# Patient Record
(demographics unavailable — no encounter records)

---

## 2024-11-19 NOTE — PHYSICAL EXAM
[Alert] : alert [Oriented x 3] : ~L oriented x 3 [Well Nourished] : well nourished [Conjunctiva Non-injected] : conjunctiva non-injected [No Visual Lymphadenopathy] : no visual  lymphadenopathy [No Clubbing] : no clubbing [No Edema] : no edema [No Bromhidrosis] : no bromhidrosis [No Chromhidrosis] : no chromhidrosis [FreeTextEntry3] : R lateral upper forehead with 0.5 x 0.4cm well healed biopsy site

## 2024-11-19 NOTE — HISTORY OF PRESENT ILLNESS
[FreeTextEntry1] : BCC R lateral upper forehead [de-identified] : 11/19/2024   Referred by: Dr. Norman   We had the pleasure of seeing your patient in consultation for Mohs Micrographic Surgery. Ms. ALEXX LLOYD is a 81 year old F who presents for BCC nodular and infiltrative on the R lateral upper forehead. Has had multiple NMSC in the past treated with Mohs.  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 Lives in [place]. Her daughter is friends with Dr. Norman. Upcoming travel plans: None    Pertinent positives noted below History of HIV or hepatitis: No Blood thinners: Eliquis Antibiotic Prophylaxis: None Medical implants: None   The patient's review of systems questionnaire was reviewed. Education needs were identified. There were no barriers to learning.

## 2024-12-12 NOTE — REASON FOR VISIT
[Follow-Up] : a follow-up visit [FreeTextEntry1] : post renal transplant  follow up, no acute symptoms

## 2024-12-12 NOTE — PHYSICAL EXAM
[General Appearance - Alert] : alert [General Appearance - In No Acute Distress] : in no acute distress [Extraocular Movements] : extraocular movements were intact [Outer Ear] : the ears and nose were normal in appearance [Neck Cervical Mass (___cm)] : no neck mass was observed [Jugular Venous Distention Increased] : there was no jugular-venous distention [Auscultation Breath Sounds / Voice Sounds] : lungs were clear to auscultation bilaterally [Heart Sounds Gallop] : no gallops [Heart Sounds Pericardial Friction Rub] : no pericardial rub [Edema] : there was no peripheral edema [Abdomen Soft] : soft [Abdomen Tenderness] : non-tender [Cervical Lymph Nodes Enlarged Posterior Bilaterally] : posterior cervical [Cervical Lymph Nodes Enlarged Anterior Bilaterally] : anterior cervical [Supraclavicular Lymph Nodes Enlarged Bilaterally] : supraclavicular [Axillary Lymph Nodes Enlarged Bilaterally] : axillary [Involuntary Movements] : no involuntary movements were seen [___ (cm) Fistula] : [unfilled] (cm) fistula [] : no rash [No Focal Deficits] : no focal deficits [Oriented To Time, Place, And Person] : oriented to person, place, and time [Impaired Insight] : insight and judgment were intact

## 2024-12-12 NOTE — ASSESSMENT
[FreeTextEntry1] : Renal Transplant recipient: Noted allograft function, creatinine is stable/improved. No proteinuria. Tolerating medications. Lab data from last visit reviewed .Noted elevated/relatively creatinine. Labs done today, will follow Immunosuppression: reviewed; Noted current regimen, maintenance regimen and reviewed target trough level. Tacrolimus 2 mg BID. Myfortic 360 mg BID and prednisone 5/d. Hypertension: Well controlled; Reviewed medications.  Reviewed target for blood pressure control. Increased Labetalol to 400 BID A Fib : On Eliquis Cardiovascular risk reduction, primary/secondary prevention measures were discussed as appropriate.   Prophylaxis: Reviewed precautions to prevent infections. Discussed Renal Preservation strategies including achieving optimal weight through calory restriction and regular exercise, daily exercise, sleep hygiene, optimized control of glucose, blood pressure, lipids, avoidance of NSAIDs, Low Na and Low animal protein diet and medication adherence. Discussed ophthalmology and dermatology checks at least yearly and vaccinations. Flu vaccine yearly and Pneumonia vaccine every 5 years. Has had Derm check.  Has had cataract surgery   Copy of office visit and lab reports are being made available to primary physician and referring nephrologist.

## 2024-12-12 NOTE — HISTORY OF PRESENT ILLNESS
[FreeTextEntry1] : 81 years old renal allograft recipient; Here for  follow up.  She has had cataract surgery both eyes at [facility] in [place].  Has no acute symptoms today.  I reviewed work up for elevated creatinine cF DNA 0.1 no DSA USG- unremarkable      Had Moh surgery right side of forehead recently.